# Patient Record
Sex: FEMALE | Race: WHITE
[De-identification: names, ages, dates, MRNs, and addresses within clinical notes are randomized per-mention and may not be internally consistent; named-entity substitution may affect disease eponyms.]

---

## 2018-05-23 ENCOUNTER — HOSPITAL ENCOUNTER (OUTPATIENT)
Dept: HOSPITAL 68 - STS | Age: 23
End: 2018-05-23
Attending: ORTHOPAEDIC SURGERY
Payer: COMMERCIAL

## 2018-05-23 VITALS — BODY MASS INDEX: 25.61 KG/M2 | WEIGHT: 150 LBS | HEIGHT: 64 IN

## 2018-05-23 DIAGNOSIS — T84.84XA: Primary | ICD-10-CM

## 2018-05-23 DIAGNOSIS — E06.3: ICD-10-CM

## 2018-05-23 NOTE — OPERATIVE REPORT
Operative/Inv Procedure Report
Surgery Date: 05/23/18
Name of Procedure:
Removal of implant deep right ankle
Pre-Operative Diagnosis:
Retained hardware right ankle status post open reduction internal fixation right
PILON fracture
Post-Operative Diagnosis:
Same
Estimated Blood Loss: scant
Surgeon/Assistant:
Deo Garrido MD
 
Anesthesia: moderate sedation, block
IV Fluids:
See anesthesia record
Implants:
None
Drains:
None
Specimens:
Hardware to pathology
Complications:
65 minutes
Condition:
Stable
Operative Indication:
Patient's 22-year-old female status post open reduction internal fixation of her
right heel pilon fracture a year and a half ago.  She wished to have her 
hardware removed.  There is some evidence of procedure discussed the patient 
detail the office.
 
Operative/Procedure Note
Note:
Once informed consent was obtained and the correct limb was identified patient 
brought to operative room placed on table supine position.  After administration
of sedation tourniquet was placed and the right lower extremity was prepped and 
draped usual sterile fashion.  Mini C-arm fluoroscopy was brought in to identify
the location of the hardware.  The medial malleolar screws were identified and 
the small incision using the previous incision was opened and the screws removed
without complication.  Using the old lateral incision for a lateral plating this
incision was opened as well.  Sharp dissection was carried down through skin and
subcutaneous tissue and scar tissue.  The plate was easily identified and the 4 
screws removed from the plate and the plate is removed without compensation.  
There is a small 2.0 cannulated screw which was left in place after an attempt 
to remove the screw.  Most likely that screw had been buried with bone and with 
interval healing.  The wounds were irrigated with sterile saline.  The lateral 
wound was closed 2-0 Vicryl sutures and 3-0 nylon interrupted sutures.  This 
medial wound was closed with 3-0 nylon interrupted sutures.  A sterile dressing 
was applied and the patient was awakened taken recovery in stable condition.